# Patient Record
Sex: MALE | Race: OTHER | Employment: OTHER | URBAN - METROPOLITAN AREA
[De-identification: names, ages, dates, MRNs, and addresses within clinical notes are randomized per-mention and may not be internally consistent; named-entity substitution may affect disease eponyms.]

---

## 2020-10-27 ENCOUNTER — NEW PATIENT COMPREHENSIVE (OUTPATIENT)
Dept: URBAN - METROPOLITAN AREA CLINIC 35 | Facility: CLINIC | Age: 55
End: 2020-10-27

## 2020-10-27 DIAGNOSIS — H52.03: ICD-10-CM

## 2020-10-27 PROCEDURE — 92015 DETERMINE REFRACTIVE STATE: CPT

## 2020-10-27 PROCEDURE — 92004 COMPRE OPH EXAM NEW PT 1/>: CPT

## 2020-10-27 ASSESSMENT — VISUAL ACUITY
OD_CC: J3
OD_CC: 20/20
OS_CC: 20/20-1
OS_SC: 20/25
OS_CC: J2
OD_SC: 20/50-1

## 2020-10-27 ASSESSMENT — TONOMETRY
OS_IOP_MMHG: 12
OD_IOP_MMHG: 13

## 2021-10-21 NOTE — PATIENT DISCUSSION
OD Ema, OS -0.50 (mini monovision) Will have patient come back one more time for another refraction ***PUSH PLUS AT THIS MR VISIT***.

## 2021-10-21 NOTE — PATIENT DISCUSSION
Plan: i-Lasik OU (OD Ema, OS -0.50) Patient is already scheduled for the 16th of Nov. Okay to keep this appointment.

## 2022-03-03 ENCOUNTER — COMPREHENSIVE EXAM (OUTPATIENT)
Dept: URBAN - METROPOLITAN AREA CLINIC 40 | Facility: CLINIC | Age: 57
End: 2022-03-03

## 2022-03-03 DIAGNOSIS — H52.03: ICD-10-CM

## 2022-03-03 DIAGNOSIS — H52.203: ICD-10-CM

## 2022-03-03 DIAGNOSIS — H52.4: ICD-10-CM

## 2022-03-03 PROCEDURE — 92014 COMPRE OPH EXAM EST PT 1/>: CPT

## 2022-03-03 PROCEDURE — 92015 DETERMINE REFRACTIVE STATE: CPT

## 2022-03-03 PROCEDURE — 92499OP2 OPTOMAP RETINAL SCREENING BOTH EYES

## 2022-03-03 ASSESSMENT — VISUAL ACUITY
OS_CC: J1
OD_CC: J1+
OS_CC: 20/20
OU_CC: 20/20
OD_CC: 20/20
OU_CC: J1+

## 2022-03-03 ASSESSMENT — TONOMETRY
OS_IOP_MMHG: 14
OD_IOP_MMHG: 14

## 2024-09-23 ENCOUNTER — COMPREHENSIVE EXAM (OUTPATIENT)
Dept: URBAN - METROPOLITAN AREA CLINIC 43 | Facility: CLINIC | Age: 59
End: 2024-09-23

## 2024-09-23 DIAGNOSIS — H52.203: ICD-10-CM

## 2024-09-23 DIAGNOSIS — H52.03: ICD-10-CM

## 2024-09-23 DIAGNOSIS — H52.4: ICD-10-CM

## 2024-09-23 PROCEDURE — 92014 COMPRE OPH EXAM EST PT 1/>: CPT

## 2024-09-23 PROCEDURE — 92015 DETERMINE REFRACTIVE STATE: CPT
